# Patient Record
Sex: FEMALE | Race: WHITE | NOT HISPANIC OR LATINO | Employment: OTHER | URBAN - METROPOLITAN AREA
[De-identification: names, ages, dates, MRNs, and addresses within clinical notes are randomized per-mention and may not be internally consistent; named-entity substitution may affect disease eponyms.]

---

## 2024-04-16 ENCOUNTER — TELEPHONE (OUTPATIENT)
Dept: GASTROENTEROLOGY | Facility: CLINIC | Age: 84
End: 2024-04-16

## 2024-04-16 NOTE — TELEPHONE ENCOUNTER
----- Message from Angella Silverio sent at 4/16/2024  1:41 PM CDT -----  Contact: Diana(daughter in law)  Type:  Sooner Apoointment Request    Caller is requesting a sooner appointment.  Caller declined first available appointment listed below.  Caller will not accept being placed on the waitlist and is requesting a message be sent to doctor.  Name of Caller:Diana   When is the first available appointment?n/a  Symptoms:abdominal pain   Would the patient rather a call back or a response via [facility]? Call back   Best Call Back Number:[phone] or [phone]  Additional Information:

## 2024-04-16 NOTE — TELEPHONE ENCOUNTER
Called and spoke with patient she stated she was a patient of  and she was having some issues she said that she is having pain in her abdomen and needs to be seen, I asked  about her insurance and she stated that she does not need a referral from her PCP, is it ok to add her to schedule .  [facility]

## 2024-04-29 RX ORDER — METHOTREXATE 2.5 MG/1
2.5 TABLET ORAL
COMMUNITY
Start: 2024-01-24

## 2024-04-29 RX ORDER — SUCRALFATE 1 G/10ML
SUSPENSION ORAL
COMMUNITY
Start: 2023-11-20 | End: 2024-04-30

## 2024-04-29 RX ORDER — PANTOPRAZOLE SODIUM 40 MG/1
40 TABLET, DELAYED RELEASE ORAL DAILY
COMMUNITY
Start: 2024-03-06 | End: 2024-05-06 | Stop reason: SDUPTHER

## 2024-04-29 RX ORDER — FAMOTIDINE 20 MG/1
20 TABLET, FILM COATED ORAL DAILY
COMMUNITY
Start: 2024-02-28

## 2024-04-29 RX ORDER — MONTELUKAST SODIUM 10 MG/1
10 TABLET ORAL DAILY
COMMUNITY
Start: 2024-02-07

## 2024-04-29 RX ORDER — FUROSEMIDE 40 MG/1
40 TABLET ORAL DAILY
COMMUNITY
Start: 2024-03-27

## 2024-04-29 RX ORDER — ARIPIPRAZOLE 15 MG/1
15 TABLET ORAL DAILY
COMMUNITY
Start: 2024-03-01

## 2024-04-29 RX ORDER — BUSPIRONE HYDROCHLORIDE 5 MG/1
5 TABLET ORAL DAILY
COMMUNITY
Start: 2024-02-21

## 2024-04-29 RX ORDER — VENLAFAXINE HYDROCHLORIDE 150 MG/1
150 CAPSULE, EXTENDED RELEASE ORAL DAILY
COMMUNITY
Start: 2024-01-27

## 2024-04-29 RX ORDER — LEVOTHYROXINE SODIUM 88 UG/1
88 TABLET ORAL
COMMUNITY
Start: 2024-04-02

## 2024-04-29 RX ORDER — METOPROLOL TARTRATE 100 MG/1
100 TABLET ORAL 2 TIMES DAILY
COMMUNITY
Start: 2024-02-28

## 2024-04-29 NOTE — PROGRESS NOTES
Clinic Note    Reason for visit:  The primary encounter diagnosis was Abnormal finding on GI tract imaging. Diagnoses of Gastric ulcer, unspecified chronicity, unspecified whether gastric ulcer hemorrhage or perforation present, Change in bowel habit, Unintentional weight loss, Constipation, unspecified constipation type, Gastroesophageal reflux disease, unspecified whether esophagitis present, and History of colon polyps were also pertinent to this visit.    PCP: John Price       HPI:  This is a 83 y.o. female who was previously established with Dr. Loera. Last seen in 2021. Patient with abdominal pain. She went to ER 10/2023 due to CP. Had negative cardiac workup. Dx with ulcer based on CT scan. She is on pantoprazole 40mg and famotidine 20mg daily. Took carate as needed after ER visit. She has been steadily losing weight since ER visit. Has lost >10 pounds. Has lost >4 pounds in the last couple of weeks. Her appetite is well and has been eating her normal diet. She has been having worsening constipation. She is stimulant laxative twice daily. Having BM every 2 days. Her pain is mainly to LUQ/LLQ. Pain does not improve with BM. She takes tramadol twice daily. Has tried MiraLax in the past which does not help. Denies blood in stool. Denies NSAID use    CT A/P 3/22/2024 bibasilar atelectasis. 8mm CBD. S/P sid en-y bypass. Prominent colonic rectal stool  CT A/P W/ 10/2/2023: prominent CBD, rosario, gastric bypass with prominent infl of the remnant stomach. Penetrating ulcer along posterior margin of stomach/jejunal jxn w/o clear pneumoperitoneum. Gastritis. Large stool burden. Stenosis of Celiac and SMA. Insufficiency fracture of L superior pubic ramus   10/2023 K 2.9L, eGFR 52L cr 1.02. CBC, CMP-nl    Colonoscopy 12/17/2021: cecal polyp (TA), long redundant, extremely tortuous colon, diverticulosis. Patent colo colonic anastomosis at 30cm.-repeat 5 years    Lap DC/Sigmoid colectomy and EGD w/ Tomlinson. Normal EGD.  Path of partial colectomy- diverticulosis, melanosis coli.    Review of Systems   Constitutional:  Positive for fatigue and unexpected weight change. Negative for fever.   HENT:  Negative for mouth sores, postnasal drip, sore throat and trouble swallowing.    Eyes:  Negative for pain, discharge and eye dryness.   Respiratory:  Positive for cough. Negative for apnea, choking, chest tightness, shortness of breath and wheezing.    Cardiovascular:  Negative for chest pain, palpitations and leg swelling.   Gastrointestinal:  Positive for abdominal distention, abdominal pain, constipation and nausea. Negative for anal bleeding, blood in stool, change in bowel habit, diarrhea, rectal pain, vomiting, reflux and fecal incontinence.   Genitourinary:  Negative for bladder incontinence, dysuria and hematuria.   Musculoskeletal:  Positive for back pain. Negative for arthralgias and joint swelling.   Integumentary:  Negative for color change and rash.   Allergic/Immunologic: Negative for environmental allergies and food allergies.   Neurological:  Negative for seizures and headaches.   Hematological:  Negative for adenopathy. Bruises/bleeds easily.        Past Medical History:   Diagnosis Date    Anxiety disorder, unspecified     Disorder of thyroid, unspecified     GERD (gastroesophageal reflux disease)     History of diverticulitis     Unspecified osteoarthritis, unspecified site      Past Surgical History:   Procedure Laterality Date    BACK SURGERY      CHOLECYSTECTOMY      FOOT SURGERY      GASTRIC BYPASS  2009    HEMICOLECTOMY Left 2021    w/ Tomlinson due to diverticulitis    HIP REPLACEMENT ARTHROPLASTY Left     HIP REPLACEMENT ARTHROPLASTY Right     HYSTERECTOMY      KNEE SURGERY Left      No family history on file.  Social History     Tobacco Use    Smoking status: Former     Current packs/day: 2.00     Types: Cigarettes    Smokeless tobacco: Never   Substance Use Topics    Alcohol use: Never    Drug use: Never     Review  of patient's allergies indicates:  No Known Allergies   Medication List with Changes/Refills   New Medications    POLYETHYLENE GLYCOL (GOLYTELY) 236-22.74-6.74 -5.86 GRAM SUSPENSION    Take 4,000 mLs (4 L total) by mouth once. for 1 dose   Current Medications    ARIPIPRAZOLE (ABILIFY) 15 MG TAB    Take 15 mg by mouth once daily.    BUSPIRONE (BUSPAR) 5 MG TAB    Take 5 mg by mouth once daily.    FAMOTIDINE (PEPCID) 20 MG TABLET    Take 20 mg by mouth once daily.    FUROSEMIDE (LASIX) 40 MG TABLET    Take 40 mg by mouth once daily.    METHOTREXATE 2.5 MG TAB    Take 2.5 mg by mouth every 7 days.    METOPROLOL TARTRATE (LOPRESSOR) 100 MG TABLET    Take 100 mg by mouth 2 (two) times daily.    MONTELUKAST (SINGULAIR) 10 MG TABLET    Take 10 mg by mouth once daily.    PANTOPRAZOLE (PROTONIX) 40 MG TABLET    Take 40 mg by mouth once daily.    SYNTHROID 88 MCG TABLET    Take 88 mcg by mouth before breakfast.    TRAMADOL (ULTRAM) 50 MG TABLET    Take 50 mg by mouth every 6 (six) hours as needed for Pain.    TRAZODONE (DESYREL) 100 MG TABLET    Take 100 mg by mouth every evening.    VENLAFAXINE (EFFEXOR-XR) 150 MG CP24    Take 150 mg by mouth once daily.   Discontinued Medications    SUCRALFATE (CARAFATE) 100 MG/ML SUSPENSION         Medication List with Changes/Refills   New Medications    POLYETHYLENE GLYCOL (GOLYTELY) 236-22.74-6.74 -5.86 GRAM SUSPENSION    Take 4,000 mLs (4 L total) by mouth once. for 1 dose   Current Medications    ARIPIPRAZOLE (ABILIFY) 15 MG TAB    Take 15 mg by mouth once daily.    BUSPIRONE (BUSPAR) 5 MG TAB    Take 5 mg by mouth once daily.    FAMOTIDINE (PEPCID) 20 MG TABLET    Take 20 mg by mouth once daily.    FUROSEMIDE (LASIX) 40 MG TABLET    Take 40 mg by mouth once daily.    METHOTREXATE 2.5 MG TAB    Take 2.5 mg by mouth every 7 days.    METOPROLOL TARTRATE (LOPRESSOR) 100 MG TABLET    Take 100 mg by mouth 2 (two) times daily.    MONTELUKAST (SINGULAIR) 10 MG TABLET    Take 10 mg by mouth  "once daily.    PANTOPRAZOLE (PROTONIX) 40 MG TABLET    Take 40 mg by mouth once daily.    SYNTHROID 88 MCG TABLET    Take 88 mcg by mouth before breakfast.    TRAMADOL (ULTRAM) 50 MG TABLET    Take 50 mg by mouth every 6 (six) hours as needed for Pain.    TRAZODONE (DESYREL) 100 MG TABLET    Take 100 mg by mouth every evening.    VENLAFAXINE (EFFEXOR-XR) 150 MG CP24    Take 150 mg by mouth once daily.   Discontinued Medications    SUCRALFATE (CARAFATE) 100 MG/ML SUSPENSION            Vital Signs:  /72 (BP Location: Right arm, Patient Position: Sitting, BP Method: Medium (Automatic))   Pulse (!) 49   Resp 16   Ht 5' 5" (1.651 m)   Wt 49.5 kg (109 lb 3.2 oz)   SpO2 95%   BMI 18.17 kg/m²        Physical Exam  Vitals reviewed.   Constitutional:       General: She is awake. She is not in acute distress.     Appearance: Normal appearance. She is well-developed. She is not ill-appearing, toxic-appearing or diaphoretic.   HENT:      Head: Normocephalic and atraumatic.      Nose: Nose normal.      Mouth/Throat:      Mouth: Mucous membranes are moist.      Pharynx: Oropharynx is clear. No oropharyngeal exudate or posterior oropharyngeal erythema.   Eyes:      General: Lids are normal. Gaze aligned appropriately. No scleral icterus.        Right eye: No discharge.         Left eye: No discharge.      Conjunctiva/sclera: Conjunctivae normal.   Neck:      Trachea: Trachea normal.   Cardiovascular:      Rate and Rhythm: Normal rate and regular rhythm.      Pulses:           Radial pulses are 2+ on the right side and 2+ on the left side.   Pulmonary:      Effort: Pulmonary effort is normal. No respiratory distress.      Breath sounds: No stridor. No wheezing.   Chest:      Chest wall: No tenderness.   Abdominal:      General: Bowel sounds are normal. There is no distension.      Palpations: Abdomen is soft. There is no fluid wave, hepatomegaly or mass.      Tenderness: There is no abdominal tenderness. There is no " guarding or rebound.   Musculoskeletal:         General: No tenderness or deformity.      Cervical back: Full passive range of motion without pain and neck supple. No tenderness.      Right lower leg: No edema.      Left lower leg: No edema.   Lymphadenopathy:      Cervical: No cervical adenopathy.   Skin:     General: Skin is warm and dry.      Capillary Refill: Capillary refill takes less than 2 seconds.      Coloration: Skin is not cyanotic, jaundiced or pale.   Neurological:      General: No focal deficit present.      Mental Status: She is alert and oriented to person, place, and time.      Motor: No tremor.   Psychiatric:         Attention and Perception: Attention normal.         Mood and Affect: Mood and affect normal.         Speech: Speech normal.         Behavior: Behavior normal. Behavior is cooperative.            All of the data above and below has been reviewed by myself and any further interpretations will be reflected in the assessment and plan.   The data includes review of external notes, and independent interpretation of lab results, procedures, x-rays, and imaging reports.      Assessment:  Abnormal finding on GI tract imaging  -     Ambulatory Referral to External Surgery    Gastric ulcer, unspecified chronicity, unspecified whether gastric ulcer hemorrhage or perforation present  -     Ambulatory Referral to External Surgery    Change in bowel habit  -     Ambulatory Referral to External Surgery  -     polyethylene glycol (GOLYTELY) 236-22.74-6.74 -5.86 gram suspension; Take 4,000 mLs (4 L total) by mouth once. for 1 dose  Dispense: 4000 mL; Refill: 0    Unintentional weight loss    Constipation, unspecified constipation type    Gastroesophageal reflux disease, unspecified whether esophagitis present    History of colon polyps      Plan for upper and lower endoscopy. R/o Ulcer, h.pylori, gastritis, polyp, tumor.  Continue with pantorpazoel and famotidine for now.  Discussed to start taking  Linzess 290mcg samples today. Will work on limiiting stimulant laxative use due to concerns of dependence.     Recommendations:    Schedule for upper and lower endoscopy with Dr. Mcknight 5/2/2024  Samples of Linzess 290mcg given. Start taking medication today.       Risks, benefits, and alternatives of medical management, any associated procedures, and/or treatment discussed with the patient. Patient given opportunity to ask questions and voices understanding. Patient has elected to proceed with the recommended care modalities as discussed.    No follow-ups on file.    Order summary:  Orders Placed This Encounter   Procedures    Ambulatory Referral to External Surgery        Instructed patient to notify my office if they have not been contacted within two weeks after any procedures, submitting any samples (biopsies, blood, stool, urine, etc.) or after any imaging (X-ray, CT, MRI, etc.).      Edna Sow NP    This document may have been created using a voice recognition transcribing system. Incorrect words or phrases may have been missed during proofreading. Please interpret accordingly or contact me for clarification.

## 2024-04-30 ENCOUNTER — TELEPHONE (OUTPATIENT)
Dept: GASTROENTEROLOGY | Facility: CLINIC | Age: 84
End: 2024-04-30

## 2024-04-30 ENCOUNTER — OFFICE VISIT (OUTPATIENT)
Dept: GASTROENTEROLOGY | Facility: CLINIC | Age: 84
End: 2024-04-30
Payer: MEDICARE

## 2024-04-30 VITALS
SYSTOLIC BLOOD PRESSURE: 122 MMHG | OXYGEN SATURATION: 95 % | WEIGHT: 109.19 LBS | HEIGHT: 65 IN | BODY MASS INDEX: 18.19 KG/M2 | RESPIRATION RATE: 16 BRPM | HEART RATE: 49 BPM | DIASTOLIC BLOOD PRESSURE: 72 MMHG

## 2024-04-30 DIAGNOSIS — K59.00 CONSTIPATION, UNSPECIFIED CONSTIPATION TYPE: ICD-10-CM

## 2024-04-30 DIAGNOSIS — K25.9 GASTRIC ULCER, UNSPECIFIED CHRONICITY, UNSPECIFIED WHETHER GASTRIC ULCER HEMORRHAGE OR PERFORATION PRESENT: ICD-10-CM

## 2024-04-30 DIAGNOSIS — K21.9 GASTROESOPHAGEAL REFLUX DISEASE, UNSPECIFIED WHETHER ESOPHAGITIS PRESENT: ICD-10-CM

## 2024-04-30 DIAGNOSIS — Z86.010 HISTORY OF COLON POLYPS: ICD-10-CM

## 2024-04-30 DIAGNOSIS — R63.4 UNINTENTIONAL WEIGHT LOSS: ICD-10-CM

## 2024-04-30 DIAGNOSIS — R93.3 ABNORMAL FINDING ON GI TRACT IMAGING: Primary | ICD-10-CM

## 2024-04-30 DIAGNOSIS — R19.4 CHANGE IN BOWEL HABIT: ICD-10-CM

## 2024-04-30 PROCEDURE — 99205 OFFICE O/P NEW HI 60 MIN: CPT | Mod: S$GLB,,, | Performed by: NURSE PRACTITIONER

## 2024-04-30 RX ORDER — TRAMADOL HYDROCHLORIDE 50 MG/1
50 TABLET ORAL EVERY 6 HOURS PRN
COMMUNITY
Start: 2023-10-18

## 2024-04-30 RX ORDER — TRAZODONE HYDROCHLORIDE 100 MG/1
100 TABLET ORAL NIGHTLY
COMMUNITY
Start: 2023-11-28

## 2024-04-30 RX ORDER — POLYETHYLENE GLYCOL 3350, SODIUM SULFATE ANHYDROUS, SODIUM BICARBONATE, SODIUM CHLORIDE, POTASSIUM CHLORIDE 236; 22.74; 6.74; 5.86; 2.97 G/4L; G/4L; G/4L; G/4L; G/4L
4 POWDER, FOR SOLUTION ORAL ONCE
Qty: 4000 ML | Refills: 0 | Status: SHIPPED | OUTPATIENT
Start: 2024-04-30 | End: 2024-04-30

## 2024-04-30 NOTE — TELEPHONE ENCOUNTER
Patient was given instructions and they were reviewed with her. Patient was given AVS with apt details. Patient was given a copy of her order for her colonoscopy. Patient was given her records back and patients records were scanned in her chart. Patients order for colonoscopy was faxed over to central scheduling at [facility].Patient was also told to go reregister due to her colonoscopy procedure being in two days. 4/30/24 lra

## 2024-04-30 NOTE — PATIENT INSTRUCTIONS
Schedule for upper and lower endoscopy with Dr. Mcknight 5/2/2024  Samples of Linzess 290mcg given. Start taking medication today.   Ok to take stimulant laxative for next 2 days. Then will work on weaning yourself off of medication.    Please notify my office if you have not been contacted within two weeks after any procedures, submitting any samples (biopsies, blood, stool, urine, etc.) or after any imaging (X-ray, CT, MRI, etc.).

## 2024-05-02 ENCOUNTER — OUTSIDE PLACE OF SERVICE (OUTPATIENT)
Dept: GASTROENTEROLOGY | Facility: CLINIC | Age: 84
End: 2024-05-02

## 2024-05-02 LAB — CRC RECOMMENDATION EXT: NORMAL

## 2024-05-02 PROCEDURE — 45385 COLONOSCOPY W/LESION REMOVAL: CPT | Mod: ,,, | Performed by: INTERNAL MEDICINE

## 2024-05-02 PROCEDURE — 43239 EGD BIOPSY SINGLE/MULTIPLE: CPT | Mod: 51,,, | Performed by: INTERNAL MEDICINE

## 2024-05-02 PROCEDURE — 45380 COLONOSCOPY AND BIOPSY: CPT | Mod: 59,,, | Performed by: INTERNAL MEDICINE

## 2024-05-06 ENCOUNTER — TELEPHONE (OUTPATIENT)
Dept: GASTROENTEROLOGY | Facility: CLINIC | Age: 84
End: 2024-05-06
Payer: MEDICARE

## 2024-05-06 DIAGNOSIS — K28.9 ANASTOMOTIC ULCER S/P GASTRIC BYPASS: Primary | ICD-10-CM

## 2024-05-06 RX ORDER — PANTOPRAZOLE SODIUM 40 MG/1
40 TABLET, DELAYED RELEASE ORAL 2 TIMES DAILY
Qty: 180 TABLET | Refills: 0 | Status: SHIPPED | OUTPATIENT
Start: 2024-05-06

## 2024-05-07 NOTE — TELEPHONE ENCOUNTER
SBBx nl, gastric pouch Bx wnl w/o Hp, 2 TA, CBx benign w/TA.    Unclear if random colon Bx had residual tissue from colon polypectomy (which I suspect).   Notify patient that her polyps and biopsies were benign.  Increase panto 40 from daily to BID. Take carafate QID (before meals and at bedtime). Repeat EGD in two months.  Add on to July 3, 2024 Wednesday. Keep ELIZ OV 7/16/2024. Rec drinking 1 Ensure after breakfast every morning to add to breakfast, not replace it. Also, rec Linzess samples starting at 145mcg daily. Get update on BM in one week.  NBP

## 2024-05-07 NOTE — TELEPHONE ENCOUNTER
Staff called pt notified of results... pt stated she's taking pantoprazole BID,  need rx for Carafate she ran out of this med and  will drink ensure after eating breakfast.  Pt c/o still having some stomach pain on the Left side..

## 2024-05-17 RX ORDER — SUCRALFATE 1 G/1
1 TABLET ORAL 4 TIMES DAILY
Qty: 120 TABLET | Refills: 2 | Status: SHIPPED | OUTPATIENT
Start: 2024-05-17

## 2024-06-07 ENCOUNTER — DOCUMENTATION ONLY (OUTPATIENT)
Dept: GASTROENTEROLOGY | Facility: CLINIC | Age: 84
End: 2024-06-07
Payer: MEDICARE

## 2024-06-16 ENCOUNTER — TELEPHONE (OUTPATIENT)
Dept: GASTROENTEROLOGY | Facility: CLINIC | Age: 84
End: 2024-06-16
Payer: MEDICARE

## 2024-06-19 NOTE — TELEPHONE ENCOUNTER
6-16-24...See notes from 5/6/2024 encounter. Update on BMs with Linzess 145 daily.  NBP      6-19-24-- staff spoke to pt.. she advised she ran out of samples but they didn't help.. pt stated she's still suffering w/constipation... --marn

## 2024-06-19 NOTE — TELEPHONE ENCOUNTER
Staff spoke to pt.. she advised she ran out of samples but they didn't help.. pt stated she's still suffering w/constipation...  --ldn    Addended and Updated to call 5-6-24

## 2024-06-20 NOTE — TELEPHONE ENCOUNTER
Staff called pt notified of samples to be picked up.. she advised she will pick them up when she come to [place] next week....  --shivani

## 2024-06-20 NOTE — TELEPHONE ENCOUNTER
We initially gave her samples of linzess 290mcg. I assume they were too strong or caused diarrhea? Then was given linzess 145. If linzess 290 did not help, may give her samples of IBSrela to try.  She would take IBSrela twice daily with food.  LIOR

## 2024-08-05 ENCOUNTER — OFFICE VISIT (OUTPATIENT)
Dept: GASTROENTEROLOGY | Facility: CLINIC | Age: 84
End: 2024-08-05
Payer: MEDICARE

## 2024-08-05 VITALS
BODY MASS INDEX: 19.6 KG/M2 | DIASTOLIC BLOOD PRESSURE: 71 MMHG | OXYGEN SATURATION: 95 % | SYSTOLIC BLOOD PRESSURE: 136 MMHG | HEART RATE: 63 BPM | WEIGHT: 117.63 LBS | HEIGHT: 65 IN

## 2024-08-05 DIAGNOSIS — K28.9 ANASTOMOTIC ULCER S/P GASTRIC BYPASS: Primary | ICD-10-CM

## 2024-08-05 DIAGNOSIS — K59.00 CONSTIPATION, UNSPECIFIED CONSTIPATION TYPE: ICD-10-CM

## 2024-08-05 DIAGNOSIS — Z86.010 HISTORY OF COLON POLYPS: ICD-10-CM

## 2024-08-05 DIAGNOSIS — K58.1 IRRITABLE BOWEL SYNDROME WITH CONSTIPATION: ICD-10-CM

## 2024-08-05 DIAGNOSIS — K21.9 GASTROESOPHAGEAL REFLUX DISEASE, UNSPECIFIED WHETHER ESOPHAGITIS PRESENT: ICD-10-CM

## 2024-08-05 DIAGNOSIS — R63.4 UNINTENTIONAL WEIGHT LOSS: ICD-10-CM

## 2024-08-05 PROCEDURE — 99215 OFFICE O/P EST HI 40 MIN: CPT | Mod: ,,,

## 2024-08-05 RX ORDER — METOLAZONE 5 MG/1
TABLET ORAL
COMMUNITY
Start: 2024-08-02 | End: 2024-08-05

## 2024-08-07 ENCOUNTER — TELEPHONE (OUTPATIENT)
Dept: GASTROENTEROLOGY | Facility: CLINIC | Age: 84
End: 2024-08-07
Payer: MEDICARE

## 2024-08-29 ENCOUNTER — OUTSIDE PLACE OF SERVICE (OUTPATIENT)
Dept: GASTROENTEROLOGY | Facility: CLINIC | Age: 84
End: 2024-08-29

## 2024-09-10 ENCOUNTER — TELEPHONE (OUTPATIENT)
Dept: GASTROENTEROLOGY | Facility: CLINIC | Age: 84
End: 2024-09-10
Payer: MEDICARE

## 2024-09-10 NOTE — TELEPHONE ENCOUNTER
----- Message from Trinidad Lang sent at 9/9/2024 11:34 AM CDT -----  States she finished the sample medication ibsrela. States it worked better than the first medication she was given. She would like the nurse to give her a call back. Please call pt [phone]. Thank you

## 2024-09-10 NOTE — TELEPHONE ENCOUNTER
Tried to contact patient and received message that patient has a voicemail that has not been set up yet. BF